# Patient Record
Sex: FEMALE | Race: WHITE | Employment: UNEMPLOYED | URBAN - METROPOLITAN AREA
[De-identification: names, ages, dates, MRNs, and addresses within clinical notes are randomized per-mention and may not be internally consistent; named-entity substitution may affect disease eponyms.]

---
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
## 2022-02-05 ENCOUNTER — HOSPITAL ENCOUNTER (EMERGENCY)
Facility: CLINIC | Age: 22
Discharge: HOME | End: 2022-02-05
Attending: EMERGENCY MEDICINE | Admitting: EMERGENCY MEDICINE
Payer: COMMERCIAL

## 2022-02-05 VITALS
RESPIRATION RATE: 18 BRPM | DIASTOLIC BLOOD PRESSURE: 73 MMHG | HEART RATE: 72 BPM | OXYGEN SATURATION: 99 % | TEMPERATURE: 97 F | SYSTOLIC BLOOD PRESSURE: 131 MMHG

## 2022-02-05 DIAGNOSIS — T74.21XA SEXUAL ASSAULT OF ADULT, INITIAL ENCOUNTER: ICD-10-CM

## 2022-02-05 PROCEDURE — 250N000011 HC RX IP 250 OP 636: Performed by: EMERGENCY MEDICINE

## 2022-02-05 PROCEDURE — 99285 EMERGENCY DEPT VISIT HI MDM: CPT | Mod: 25

## 2022-02-05 PROCEDURE — 250N000009 HC RX 250: Performed by: EMERGENCY MEDICINE

## 2022-02-05 PROCEDURE — 250N000013 HC RX MED GY IP 250 OP 250 PS 637: Performed by: EMERGENCY MEDICINE

## 2022-02-05 PROCEDURE — 96372 THER/PROPH/DIAG INJ SC/IM: CPT | Performed by: EMERGENCY MEDICINE

## 2022-02-05 RX ORDER — METRONIDAZOLE 500 MG/1
500 TABLET ORAL 2 TIMES DAILY
Qty: 14 TABLET | Refills: 0 | Status: SHIPPED | OUTPATIENT
Start: 2022-02-05 | End: 2022-02-12

## 2022-02-05 RX ORDER — EMTRICITABINE AND TENOFOVIR DISOPROXIL FUMARATE 200; 300 MG/1; MG/1
1 TABLET, FILM COATED ORAL DAILY
Qty: 25 TABLET | Refills: 0 | Status: SHIPPED | OUTPATIENT
Start: 2022-02-05 | End: 2022-03-02

## 2022-02-05 RX ORDER — ONDANSETRON 4 MG/1
4 TABLET, ORALLY DISINTEGRATING ORAL EVERY 8 HOURS PRN
Qty: 10 TABLET | Refills: 0 | Status: SHIPPED | OUTPATIENT
Start: 2022-02-05 | End: 2022-02-08

## 2022-02-05 RX ORDER — DOLUTEGRAVIR SODIUM 50 MG/1
50 TABLET, FILM COATED ORAL DAILY
Qty: 25 TABLET | Refills: 0 | Status: SHIPPED | OUTPATIENT
Start: 2022-02-05 | End: 2022-03-02

## 2022-02-05 RX ORDER — DOXYCYCLINE 100 MG/1
100 CAPSULE ORAL 2 TIMES DAILY
Qty: 14 CAPSULE | Refills: 0 | Status: SHIPPED | OUTPATIENT
Start: 2022-02-05 | End: 2022-02-12

## 2022-02-05 RX ORDER — EMTRICITABINE AND TENOFOVIR DISOPROXIL FUMARATE 200; 300 MG/1; MG/1
1 TABLET, FILM COATED ORAL ONCE
Status: COMPLETED | OUTPATIENT
Start: 2022-02-05 | End: 2022-02-05

## 2022-02-05 RX ADMIN — DOLUTEGRAVIR SODIUM 50 MG: 50 TABLET, FILM COATED ORAL at 13:16

## 2022-02-05 RX ADMIN — LIDOCAINE HYDROCHLORIDE 500 MG: 10 INJECTION, SOLUTION EPIDURAL; INFILTRATION; INTRACAUDAL; PERINEURAL at 13:16

## 2022-02-05 RX ADMIN — ULIPRISTAL ACETATE 30 MG: 30 TABLET ORAL at 13:16

## 2022-02-05 RX ADMIN — EMTRICITABINE AND TENOFOVIR DISOPROXIL FUMARATE 1 TABLET: 200; 300 TABLET, FILM COATED ORAL at 13:16

## 2022-02-05 ASSESSMENT — ENCOUNTER SYMPTOMS
ARTHRALGIAS: 0
ABDOMINAL PAIN: 0
MYALGIAS: 0

## 2022-02-05 NOTE — ED NOTES
3 day supply of tivicay and truvada given and discussed with pt and mother. All questions answered.
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
## 2022-10-20 ENCOUNTER — LAB REQUISITION (OUTPATIENT)
Dept: LAB | Facility: CLINIC | Age: 22
End: 2022-10-20
Payer: COMMERCIAL

## 2022-10-20 PROCEDURE — 88305 TISSUE EXAM BY PATHOLOGIST: CPT | Mod: TC,ORL | Performed by: OBSTETRICS & GYNECOLOGY

## 2022-10-20 PROCEDURE — 88305 TISSUE EXAM BY PATHOLOGIST: CPT | Mod: 26 | Performed by: PATHOLOGY

## 2022-10-26 LAB
PATH REPORT.COMMENTS IMP SPEC: NORMAL
PATH REPORT.COMMENTS IMP SPEC: NORMAL
PATH REPORT.FINAL DX SPEC: NORMAL
PATH REPORT.GROSS SPEC: NORMAL
PATH REPORT.MICROSCOPIC SPEC OTHER STN: NORMAL
PATH REPORT.RELEVANT HX SPEC: NORMAL
PHOTO IMAGE: NORMAL

## 2024-08-09 LAB
HEPATITIS B SURFACE ANTIGEN (EXTERNAL): NEGATIVE
HIV1+2 AB SERPL QL IA: NEGATIVE
RUBELLA ANTIBODY IGG (EXTERNAL): NORMAL

## 2025-01-14 LAB — GROUP B STREPTOCOCCUS (EXTERNAL): NEGATIVE

## 2025-01-24 ENCOUNTER — HOSPITAL ENCOUNTER (INPATIENT)
Facility: CLINIC | Age: 25
LOS: 3 days | Discharge: HOME | End: 2025-01-27
Attending: OBSTETRICS & GYNECOLOGY | Admitting: OBSTETRICS & GYNECOLOGY
Payer: COMMERCIAL

## 2025-01-24 PROBLEM — Z34.90 ENCOUNTER FOR ELECTIVE INDUCTION OF LABOR: Status: ACTIVE | Noted: 2025-01-24

## 2025-01-24 LAB
ABO + RH BLD: ABNORMAL
BLD GP AB SCN SERPL QL: POSITIVE
HGB BLD-MCNC: 9.7 G/DL (ref 11.7–15.7)
SPECIMEN EXP DATE BLD: ABNORMAL

## 2025-01-24 PROCEDURE — 86900 BLOOD TYPING SEROLOGIC ABO: CPT | Performed by: OBSTETRICS & GYNECOLOGY

## 2025-01-24 PROCEDURE — 250N000013 HC RX MED GY IP 250 OP 250 PS 637: Performed by: OBSTETRICS & GYNECOLOGY

## 2025-01-24 PROCEDURE — 86780 TREPONEMA PALLIDUM: CPT | Performed by: OBSTETRICS & GYNECOLOGY

## 2025-01-24 PROCEDURE — 120N000001 HC R&B MED SURG/OB

## 2025-01-24 PROCEDURE — 85018 HEMOGLOBIN: CPT | Performed by: OBSTETRICS & GYNECOLOGY

## 2025-01-24 PROCEDURE — 86870 RBC ANTIBODY IDENTIFICATION: CPT | Performed by: OBSTETRICS & GYNECOLOGY

## 2025-01-24 RX ORDER — OXYTOCIN 10 [USP'U]/ML
10 INJECTION, SOLUTION INTRAMUSCULAR; INTRAVENOUS
Status: DISCONTINUED | OUTPATIENT
Start: 2025-01-24 | End: 2025-01-25 | Stop reason: HOSPADM

## 2025-01-24 RX ORDER — VITAMIN A, VITAMIN C, VITAMIN D-3, VITAMIN E, VITAMIN B-1, VITAMIN B-2, NIACIN, VITAMIN B-6, CALCIUM, IRON, ZINC, COPPER 4000; 120; 400; 22; 1.84; 3; 20; 10; 1; 12; 200; 27; 25; 2 [IU]/1; MG/1; [IU]/1; MG/1; MG/1; MG/1; MG/1; MG/1; MG/1; UG/1; MG/1; MG/1; MG/1; MG/1
1 TABLET ORAL DAILY
COMMUNITY

## 2025-01-24 RX ORDER — METOCLOPRAMIDE 10 MG/1
10 TABLET ORAL EVERY 6 HOURS PRN
Status: DISCONTINUED | OUTPATIENT
Start: 2025-01-24 | End: 2025-01-25 | Stop reason: HOSPADM

## 2025-01-24 RX ORDER — METHYLERGONOVINE MALEATE 0.2 MG/ML
200 INJECTION INTRAVENOUS
Status: DISCONTINUED | OUTPATIENT
Start: 2025-01-24 | End: 2025-01-25 | Stop reason: HOSPADM

## 2025-01-24 RX ORDER — LOPERAMIDE HYDROCHLORIDE 2 MG/1
4 CAPSULE ORAL
Status: DISCONTINUED | OUTPATIENT
Start: 2025-01-24 | End: 2025-01-25 | Stop reason: HOSPADM

## 2025-01-24 RX ORDER — NALOXONE HYDROCHLORIDE 0.4 MG/ML
0.2 INJECTION, SOLUTION INTRAMUSCULAR; INTRAVENOUS; SUBCUTANEOUS
Status: DISCONTINUED | OUTPATIENT
Start: 2025-01-24 | End: 2025-01-25 | Stop reason: HOSPADM

## 2025-01-24 RX ORDER — PROCHLORPERAZINE MALEATE 10 MG
10 TABLET ORAL EVERY 6 HOURS PRN
Status: DISCONTINUED | OUTPATIENT
Start: 2025-01-24 | End: 2025-01-25 | Stop reason: HOSPADM

## 2025-01-24 RX ORDER — MISOPROSTOL 200 UG/1
800 TABLET ORAL
Status: DISCONTINUED | OUTPATIENT
Start: 2025-01-24 | End: 2025-01-25 | Stop reason: HOSPADM

## 2025-01-24 RX ORDER — METOCLOPRAMIDE HYDROCHLORIDE 5 MG/ML
10 INJECTION INTRAMUSCULAR; INTRAVENOUS EVERY 6 HOURS PRN
Status: DISCONTINUED | OUTPATIENT
Start: 2025-01-24 | End: 2025-01-25 | Stop reason: HOSPADM

## 2025-01-24 RX ORDER — TRANEXAMIC ACID 10 MG/ML
1 INJECTION, SOLUTION INTRAVENOUS EVERY 30 MIN PRN
Status: DISCONTINUED | OUTPATIENT
Start: 2025-01-24 | End: 2025-01-25 | Stop reason: HOSPADM

## 2025-01-24 RX ORDER — KETOROLAC TROMETHAMINE 30 MG/ML
30 INJECTION, SOLUTION INTRAMUSCULAR; INTRAVENOUS
Status: COMPLETED | OUTPATIENT
Start: 2025-01-24 | End: 2025-01-25

## 2025-01-24 RX ORDER — CITRIC ACID/SODIUM CITRATE 334-500MG
30 SOLUTION, ORAL ORAL
Status: DISCONTINUED | OUTPATIENT
Start: 2025-01-24 | End: 2025-01-25 | Stop reason: HOSPADM

## 2025-01-24 RX ORDER — OXYTOCIN/0.9 % SODIUM CHLORIDE 30/500 ML
100-340 PLASTIC BAG, INJECTION (ML) INTRAVENOUS CONTINUOUS PRN
Status: DISCONTINUED | OUTPATIENT
Start: 2025-01-24 | End: 2025-01-27 | Stop reason: HOSPADM

## 2025-01-24 RX ORDER — IBUPROFEN 800 MG/1
800 TABLET, FILM COATED ORAL
Status: COMPLETED | OUTPATIENT
Start: 2025-01-24 | End: 2025-01-25

## 2025-01-24 RX ORDER — OXYTOCIN/0.9 % SODIUM CHLORIDE 30/500 ML
340 PLASTIC BAG, INJECTION (ML) INTRAVENOUS CONTINUOUS PRN
Status: DISCONTINUED | OUTPATIENT
Start: 2025-01-24 | End: 2025-01-25 | Stop reason: HOSPADM

## 2025-01-24 RX ORDER — LOPERAMIDE HYDROCHLORIDE 2 MG/1
2 CAPSULE ORAL
Status: DISCONTINUED | OUTPATIENT
Start: 2025-01-24 | End: 2025-01-25 | Stop reason: HOSPADM

## 2025-01-24 RX ORDER — OXYTOCIN 10 [USP'U]/ML
10 INJECTION, SOLUTION INTRAMUSCULAR; INTRAVENOUS
Status: DISCONTINUED | OUTPATIENT
Start: 2025-01-24 | End: 2025-01-27 | Stop reason: HOSPADM

## 2025-01-24 RX ORDER — CARBOPROST TROMETHAMINE 250 UG/ML
250 INJECTION, SOLUTION INTRAMUSCULAR
Status: DISCONTINUED | OUTPATIENT
Start: 2025-01-24 | End: 2025-01-25 | Stop reason: HOSPADM

## 2025-01-24 RX ORDER — ONDANSETRON 4 MG/1
4 TABLET, ORALLY DISINTEGRATING ORAL EVERY 6 HOURS PRN
Status: DISCONTINUED | OUTPATIENT
Start: 2025-01-24 | End: 2025-01-25 | Stop reason: HOSPADM

## 2025-01-24 RX ORDER — NALOXONE HYDROCHLORIDE 0.4 MG/ML
0.4 INJECTION, SOLUTION INTRAMUSCULAR; INTRAVENOUS; SUBCUTANEOUS
Status: DISCONTINUED | OUTPATIENT
Start: 2025-01-24 | End: 2025-01-25 | Stop reason: HOSPADM

## 2025-01-24 RX ORDER — MISOPROSTOL 200 UG/1
400 TABLET ORAL
Status: DISCONTINUED | OUTPATIENT
Start: 2025-01-24 | End: 2025-01-25 | Stop reason: HOSPADM

## 2025-01-24 RX ORDER — ONDANSETRON 2 MG/ML
4 INJECTION INTRAMUSCULAR; INTRAVENOUS EVERY 6 HOURS PRN
Status: DISCONTINUED | OUTPATIENT
Start: 2025-01-24 | End: 2025-01-25 | Stop reason: HOSPADM

## 2025-01-24 RX ORDER — MISOPROSTOL 100 UG/1
25 TABLET ORAL
Status: DISCONTINUED | OUTPATIENT
Start: 2025-01-24 | End: 2025-01-25 | Stop reason: HOSPADM

## 2025-01-24 RX ORDER — ACETAMINOPHEN 325 MG/1
325-650 TABLET ORAL EVERY 6 HOURS PRN
COMMUNITY

## 2025-01-24 RX ADMIN — MISOPROSTOL 25 MCG: 100 TABLET ORAL at 22:22

## 2025-01-24 ASSESSMENT — ACTIVITIES OF DAILY LIVING (ADL)
ADLS_ACUITY_SCORE: 41
ADLS_ACUITY_SCORE: 41
ADLS_ACUITY_SCORE: 15

## 2025-01-25 ENCOUNTER — ANESTHESIA (OUTPATIENT)
Dept: OBGYN | Facility: CLINIC | Age: 25
End: 2025-01-25
Payer: COMMERCIAL

## 2025-01-25 ENCOUNTER — ANESTHESIA EVENT (OUTPATIENT)
Dept: OBGYN | Facility: CLINIC | Age: 25
End: 2025-01-25
Payer: COMMERCIAL

## 2025-01-25 LAB
BLD GP AB INVEST PLASRBC-IMP: NORMAL
SPECIMEN EXP DATE BLD: NORMAL
T PALLIDUM AB SER QL: NONREACTIVE

## 2025-01-25 PROCEDURE — 370N000003 HC ANESTHESIA WARD SERVICE: Performed by: ANESTHESIOLOGY

## 2025-01-25 PROCEDURE — 0HQ9XZZ REPAIR PERINEUM SKIN, EXTERNAL APPROACH: ICD-10-PCS | Performed by: OBSTETRICS & GYNECOLOGY

## 2025-01-25 PROCEDURE — 10907ZC DRAINAGE OF AMNIOTIC FLUID, THERAPEUTIC FROM PRODUCTS OF CONCEPTION, VIA NATURAL OR ARTIFICIAL OPENING: ICD-10-PCS | Performed by: OBSTETRICS & GYNECOLOGY

## 2025-01-25 PROCEDURE — 250N000009 HC RX 250: Performed by: OBSTETRICS & GYNECOLOGY

## 2025-01-25 PROCEDURE — 250N000011 HC RX IP 250 OP 636: Performed by: ANESTHESIOLOGY

## 2025-01-25 PROCEDURE — 00HU33Z INSERTION OF INFUSION DEVICE INTO SPINAL CANAL, PERCUTANEOUS APPROACH: ICD-10-PCS | Performed by: ANESTHESIOLOGY

## 2025-01-25 PROCEDURE — 250N000013 HC RX MED GY IP 250 OP 250 PS 637: Performed by: OBSTETRICS & GYNECOLOGY

## 2025-01-25 PROCEDURE — 250N000011 HC RX IP 250 OP 636: Performed by: OBSTETRICS & GYNECOLOGY

## 2025-01-25 PROCEDURE — 120N000001 HC R&B MED SURG/OB

## 2025-01-25 PROCEDURE — 258N000003 HC RX IP 258 OP 636: Performed by: OBSTETRICS & GYNECOLOGY

## 2025-01-25 PROCEDURE — 3E0R3BZ INTRODUCTION OF ANESTHETIC AGENT INTO SPINAL CANAL, PERCUTANEOUS APPROACH: ICD-10-PCS | Performed by: ANESTHESIOLOGY

## 2025-01-25 PROCEDURE — 722N000001 HC LABOR CARE VAGINAL DELIVERY SINGLE

## 2025-01-25 RX ORDER — NALBUPHINE HYDROCHLORIDE 10 MG/ML
2.5-5 INJECTION INTRAMUSCULAR; INTRAVENOUS; SUBCUTANEOUS EVERY 6 HOURS PRN
Status: DISCONTINUED | OUTPATIENT
Start: 2025-01-25 | End: 2025-01-25

## 2025-01-25 RX ORDER — ONDANSETRON 2 MG/ML
4 INJECTION INTRAMUSCULAR; INTRAVENOUS EVERY 6 HOURS PRN
Status: DISCONTINUED | OUTPATIENT
Start: 2025-01-25 | End: 2025-01-25

## 2025-01-25 RX ORDER — ACETAMINOPHEN 325 MG/1
650 TABLET ORAL EVERY 4 HOURS PRN
Status: DISCONTINUED | OUTPATIENT
Start: 2025-01-25 | End: 2025-01-27 | Stop reason: HOSPADM

## 2025-01-25 RX ORDER — LOPERAMIDE HYDROCHLORIDE 2 MG/1
2 CAPSULE ORAL
Status: DISCONTINUED | OUTPATIENT
Start: 2025-01-25 | End: 2025-01-27 | Stop reason: HOSPADM

## 2025-01-25 RX ORDER — HYDROCORTISONE 25 MG/G
CREAM TOPICAL 3 TIMES DAILY PRN
Status: DISCONTINUED | OUTPATIENT
Start: 2025-01-25 | End: 2025-01-27 | Stop reason: HOSPADM

## 2025-01-25 RX ORDER — MODIFIED LANOLIN
OINTMENT (GRAM) TOPICAL
Status: DISCONTINUED | OUTPATIENT
Start: 2025-01-25 | End: 2025-01-27 | Stop reason: HOSPADM

## 2025-01-25 RX ORDER — OXYTOCIN/0.9 % SODIUM CHLORIDE 30/500 ML
1-24 PLASTIC BAG, INJECTION (ML) INTRAVENOUS CONTINUOUS
Status: DISCONTINUED | OUTPATIENT
Start: 2025-01-25 | End: 2025-01-25

## 2025-01-25 RX ORDER — ONDANSETRON 4 MG/1
4 TABLET, ORALLY DISINTEGRATING ORAL EVERY 6 HOURS PRN
Status: DISCONTINUED | OUTPATIENT
Start: 2025-01-25 | End: 2025-01-25

## 2025-01-25 RX ORDER — SODIUM CHLORIDE, SODIUM LACTATE, POTASSIUM CHLORIDE, CALCIUM CHLORIDE 600; 310; 30; 20 MG/100ML; MG/100ML; MG/100ML; MG/100ML
INJECTION, SOLUTION INTRAVENOUS CONTINUOUS
Status: DISCONTINUED | OUTPATIENT
Start: 2025-01-25 | End: 2025-01-25

## 2025-01-25 RX ORDER — BISACODYL 10 MG
10 SUPPOSITORY, RECTAL RECTAL DAILY PRN
Status: DISCONTINUED | OUTPATIENT
Start: 2025-01-25 | End: 2025-01-27 | Stop reason: HOSPADM

## 2025-01-25 RX ORDER — FENTANYL CITRATE 50 UG/ML
100 INJECTION, SOLUTION INTRAMUSCULAR; INTRAVENOUS ONCE
Status: DISCONTINUED | OUTPATIENT
Start: 2025-01-25 | End: 2025-01-25

## 2025-01-25 RX ORDER — FENTANYL CITRATE 50 UG/ML
INJECTION, SOLUTION INTRAMUSCULAR; INTRAVENOUS
Status: COMPLETED | OUTPATIENT
Start: 2025-01-25 | End: 2025-01-25

## 2025-01-25 RX ORDER — OXYTOCIN/0.9 % SODIUM CHLORIDE 30/500 ML
340 PLASTIC BAG, INJECTION (ML) INTRAVENOUS CONTINUOUS PRN
Status: DISCONTINUED | OUTPATIENT
Start: 2025-01-25 | End: 2025-01-27 | Stop reason: HOSPADM

## 2025-01-25 RX ORDER — OXYTOCIN 10 [USP'U]/ML
10 INJECTION, SOLUTION INTRAMUSCULAR; INTRAVENOUS
Status: DISCONTINUED | OUTPATIENT
Start: 2025-01-25 | End: 2025-01-27 | Stop reason: HOSPADM

## 2025-01-25 RX ORDER — TRANEXAMIC ACID 10 MG/ML
1 INJECTION, SOLUTION INTRAVENOUS EVERY 30 MIN PRN
Status: DISCONTINUED | OUTPATIENT
Start: 2025-01-25 | End: 2025-01-27 | Stop reason: HOSPADM

## 2025-01-25 RX ORDER — ONDANSETRON 2 MG/ML
4 INJECTION INTRAMUSCULAR; INTRAVENOUS EVERY 6 HOURS PRN
Status: DISCONTINUED | OUTPATIENT
Start: 2025-01-25 | End: 2025-01-27 | Stop reason: HOSPADM

## 2025-01-25 RX ORDER — CARBOPROST TROMETHAMINE 250 UG/ML
250 INJECTION, SOLUTION INTRAMUSCULAR
Status: DISCONTINUED | OUTPATIENT
Start: 2025-01-25 | End: 2025-01-27 | Stop reason: HOSPADM

## 2025-01-25 RX ORDER — FENTANYL CITRATE 50 UG/ML
100 INJECTION, SOLUTION INTRAMUSCULAR; INTRAVENOUS
Status: DISCONTINUED | OUTPATIENT
Start: 2025-01-25 | End: 2025-01-25

## 2025-01-25 RX ORDER — EPHEDRINE SULFATE 50 MG/ML
5 INJECTION, SOLUTION INTRAMUSCULAR; INTRAVENOUS; SUBCUTANEOUS
Status: DISCONTINUED | OUTPATIENT
Start: 2025-01-25 | End: 2025-01-25

## 2025-01-25 RX ORDER — DOCUSATE SODIUM 100 MG/1
100 CAPSULE, LIQUID FILLED ORAL DAILY
Status: DISCONTINUED | OUTPATIENT
Start: 2025-01-25 | End: 2025-01-27 | Stop reason: HOSPADM

## 2025-01-25 RX ORDER — MISOPROSTOL 200 UG/1
400 TABLET ORAL
Status: DISCONTINUED | OUTPATIENT
Start: 2025-01-25 | End: 2025-01-27 | Stop reason: HOSPADM

## 2025-01-25 RX ORDER — METHYLERGONOVINE MALEATE 0.2 MG/ML
200 INJECTION INTRAVENOUS
Status: DISCONTINUED | OUTPATIENT
Start: 2025-01-25 | End: 2025-01-27 | Stop reason: HOSPADM

## 2025-01-25 RX ORDER — MISOPROSTOL 200 UG/1
800 TABLET ORAL
Status: DISCONTINUED | OUTPATIENT
Start: 2025-01-25 | End: 2025-01-27 | Stop reason: HOSPADM

## 2025-01-25 RX ORDER — LOPERAMIDE HYDROCHLORIDE 2 MG/1
4 CAPSULE ORAL
Status: DISCONTINUED | OUTPATIENT
Start: 2025-01-25 | End: 2025-01-27 | Stop reason: HOSPADM

## 2025-01-25 RX ORDER — IBUPROFEN 800 MG/1
800 TABLET, FILM COATED ORAL EVERY 6 HOURS PRN
Status: DISCONTINUED | OUTPATIENT
Start: 2025-01-26 | End: 2025-01-27 | Stop reason: HOSPADM

## 2025-01-25 RX ADMIN — MISOPROSTOL 25 MCG: 100 TABLET ORAL at 00:44

## 2025-01-25 RX ADMIN — SODIUM CHLORIDE, POTASSIUM CHLORIDE, SODIUM LACTATE AND CALCIUM CHLORIDE: 600; 310; 30; 20 INJECTION, SOLUTION INTRAVENOUS at 13:49

## 2025-01-25 RX ADMIN — MISOPROSTOL 25 MCG: 100 TABLET ORAL at 02:51

## 2025-01-25 RX ADMIN — KETOROLAC TROMETHAMINE 30 MG: 30 INJECTION, SOLUTION INTRAMUSCULAR at 18:38

## 2025-01-25 RX ADMIN — Medication: at 15:45

## 2025-01-25 RX ADMIN — ONDANSETRON 4 MG: 2 INJECTION INTRAMUSCULAR; INTRAVENOUS at 19:57

## 2025-01-25 RX ADMIN — FENTANYL CITRATE 100 MCG: 50 INJECTION, SOLUTION INTRAMUSCULAR; INTRAVENOUS at 13:41

## 2025-01-25 RX ADMIN — MISOPROSTOL 25 MCG: 100 TABLET ORAL at 10:52

## 2025-01-25 RX ADMIN — MISOPROSTOL 25 MCG: 100 TABLET ORAL at 08:52

## 2025-01-25 RX ADMIN — FENTANYL CITRATE 100 MCG: 50 INJECTION INTRAMUSCULAR; INTRAVENOUS at 15:46

## 2025-01-25 RX ADMIN — MISOPROSTOL 25 MCG: 100 TABLET ORAL at 06:47

## 2025-01-25 RX ADMIN — MISOPROSTOL 25 MCG: 100 TABLET ORAL at 04:49

## 2025-01-25 RX ADMIN — Medication 2 MILLI-UNITS/MIN: at 18:08

## 2025-01-25 ASSESSMENT — ACTIVITIES OF DAILY LIVING (ADL)
ADLS_ACUITY_SCORE: 17
ADLS_ACUITY_SCORE: 16
ADLS_ACUITY_SCORE: 17
ADLS_ACUITY_SCORE: 16
ADLS_ACUITY_SCORE: 16
ADLS_ACUITY_SCORE: 17
ADLS_ACUITY_SCORE: 16
ADLS_ACUITY_SCORE: 17
ADLS_ACUITY_SCORE: 16
ADLS_ACUITY_SCORE: 17
ADLS_ACUITY_SCORE: 16
ADLS_ACUITY_SCORE: 17
ADLS_ACUITY_SCORE: 16

## 2025-01-25 NOTE — ANESTHESIA PREPROCEDURE EVALUATION
"Anesthesia Pre-Procedure Evaluation    Patient: Sherly Iglesias   MRN: [id number] : 2000        Procedure :           Past Medical History:   Diagnosis Date    Depressive disorder       Past Surgical History:   Procedure Laterality Date    GENITOURINARY SURGERY  2009    GYN SURGERY      D&C      No Known Allergies   Social History     Tobacco Use    Smoking status: Never    Smokeless tobacco: Never   Substance Use Topics    Alcohol use: Not Currently      Wt Readings from Last 1 Encounters:   25 65.8 kg (145 lb)        Anesthesia Evaluation   Pt has had prior anesthetic.     No history of anesthetic complications       ROS/MED HX  ENT/Pulmonary:  - neg pulmonary ROS     Neurologic:  - neg neurologic ROS     Cardiovascular:  - neg cardiovascular ROS     METS/Exercise Tolerance:     Hematologic:       Musculoskeletal:       GI/Hepatic:  - neg GI/hepatic ROS     Renal/Genitourinary:       Endo:  - neg endo ROS     Psychiatric/Substance Use:  - neg psychiatric ROS     Infectious Disease:       Malignancy:       Other:            Physical Exam    Airway         TM distance: > 3 FB   Neck ROM: full   Mouth opening: > 3 cm    Respiratory Devices and Support         Dental           Cardiovascular             Pulmonary                   OUTSIDE LABS:  CBC:   Lab Results   Component Value Date    WBC 6.7 2019    HGB 9.7 (L) 2025    HGB 12.9 2019    HCT 39.3 2019     2019     BMP:   Lab Results   Component Value Date     2019    POTASSIUM 3.7 2019    CHLORIDE 109 2019    CO2 20 2019    BUN 8 2019    CR 0.72 2019    GLC 87 2019     COAGS: No results found for: \"PTT\", \"INR\", \"FIBR\"  POC:   Lab Results   Component Value Date    HCG Negative 2019     HEPATIC:   Lab Results   Component Value Date    ALBUMIN 4.7 2019    PROTTOTAL 8.4 2019    ALT 17 2019    AST 16 2019    ALKPHOS 94 2019    " BILITOTAL 0.3 03/30/2019     OTHER:   Lab Results   Component Value Date    YURIDIA 9.3 03/30/2019       Anesthesia Plan    ASA Status:  2       Anesthesia Type: Epidural.              Consents    Anesthesia Plan(s) and associated risks, benefits, and realistic alternatives discussed. Questions answered and patient/representative(s) expressed understanding.     - Discussed:     - Discussed with:  Patient            Postoperative Care            Comments:           neg OB ROS.      Vitaliy Dean MD    I have reviewed the pertinent notes and labs in the chart from the past 30 days and (re)examined the patient.  Any updates or changes from those notes are reflected in this note.    Clinically Significant Risk Factors Present on Admission                        # Anemia: based on hgb <11

## 2025-01-25 NOTE — PROVIDER NOTIFICATION
01/25/25 1531   Provider Notification   Provider Name/Title Dr. Mcneal   Method of Notification Electronic Page     Updated Dr. Mcneal that pt is now getting epidural. SVE 4/90/1 with bloody show.    MD orders for SVE after epidural and update.

## 2025-01-25 NOTE — PROVIDER NOTIFICATION
01/25/25 1000   Provider Notification   Provider Name/Title Dr. Mcneal   Method of Notification Phone     Dr. Mcneal called for update. Updated MD on ctx's q4-5 minutes, palpating mild. She has received 6 doses of PO Miso. Patient rating 3/10, coping. FHT's currently cat I tracing with moderate variability and accelerations present (had one VD over an hour ago).    MD states she is currently at [facility] and to page her if needed.

## 2025-01-25 NOTE — PROVIDER NOTIFICATION
01/25/25 1611   Provider Notification   Provider Name/Title Dr. Mcneal   Method of Notification Electronic Page   Request Evaluate - Remote     Vocera update to Dr. Mcneal:    Pt is comfortable, pool in place, SVE 5.5/95/2. The baby is very low. Cat I tracing currently. Spontaneous ctx's q2-4 min.    MD states she will head over in 20 minutes.

## 2025-01-25 NOTE — PROVIDER NOTIFICATION
01/25/25 1532   Provider Notification   Provider Name/Title Dr. Dean   Method of Notification At Bedside     Dr. Dean at bedside for epidural placement per pt request for pain management in labor.

## 2025-01-25 NOTE — PROVIDER NOTIFICATION
01/25/25 1159   Provider Notification   Provider Name/Title Dr. Mcneal   Method of Notification At Bedside   Request Evaluate in Person     Dr. Mcneal at bedside to evaluate patient. SVE per provider 2/80/+1, AROM with large amount of clear fluid noted. Plan per provider is to monitor for 2 hours, recheck SVE at 1400. Primary RN Domonique hudson.

## 2025-01-25 NOTE — H&P
Admission H and P    S: Pt is being admitted for induction of labor for known gestational hypertension.     Past Medical History: Depression    Surgical: Negative    Social: Partnered. No D/A/T    O:  There were no vitals taken for this visit.  SVE: Per nursing at arrival  TOCO: irregular contractions  FHR: 135 baseline. Reactive    A/P: 25 yo  at 37+0/7 wks. Induction of labor for gestational hypertension.   Anticipate . Will start with oral Cytotec induction. Monitor BP closely. Low threshold for BP management.   Pain medication as desired.   GBS negative, RH negative.     Shama Mcneal MD

## 2025-01-25 NOTE — PLAN OF CARE
"Pt resting in bed. No signs/symptoms of distress. Pt states she was able to get intermittent sleep throughout the night. Rates contraction pain 4/10 - still talking through them. Cat 1 FHR tracing. Anticipate giving report to UBALDO Youssef, who will assume cares after 0700. Shama Perea RN on 1/25/2025 at 7:05 AM    Problem: Adult Inpatient Plan of Care  Goal: Patient-Specific Goal (Individualized)  Description: You can add care plan individualizations to a care plan. Examples of Individualization might be:  \"Parent requests to be called daily at 9am for status\", \"I have a hard time hearing out of my right ear\", or \"Do not touch me to wake me up as it startles  me\".  Outcome: Progressing  Flowsheets (Taken 1/24/2025 2230)  Individualized Care Needs: None  Anxieties, Fears or Concerns: None  Goal: Absence of Hospital-Acquired Illness or Injury  Intervention: Prevent Skin Injury  Recent Flowsheet Documentation  Taken 1/24/2025 2117 by Shama Perea RN  Body Position: position changed independently  Goal: Optimal Comfort and Wellbeing  Intervention: Provide Person-Centered Care  Recent Flowsheet Documentation  Taken 1/24/2025 2117 by Shama Perea RN  Trust Relationship/Rapport:   care explained   choices provided   questions answered   questions encouraged   thoughts/feelings acknowledged  Goal: Readiness for Transition of Care  Outcome: Progressing  Flowsheets (Taken 1/24/2025 2230)  Transportation Anticipated: family or friend will provide  Intervention: Mutually Develop Transition Plan  Recent Flowsheet Documentation  Taken 1/24/2025 2230 by Shama Perea RN  Transportation Anticipated: family or friend will provide  Patient/Family Anticipated Services at Transition: none  Patient/Family Anticipates Transition to: home with family  Taken 1/24/2025 2225 by Shama Perea RN  Equipment Currently Used at Home: none     Problem: Labor  Goal: Stable Fetal Wellbeing  Intervention: Promote and " Monitor Fetal Wellbeing  Recent Flowsheet Documentation  Taken 1/24/2025 2117 by Shama Perea, RN  Body Position: position changed independently   Goal Outcome Evaluation:

## 2025-01-25 NOTE — PROVIDER NOTIFICATION
01/25/25 0582   Provider Notification   Provider Name/Title Dr. Mcneal   Method of Notification Electronic Page   Request Evaluate - Remote   Notification Reason Pain;SVE;Status Update     Vocera update to Dr. Mcneal:    Pt requested an SVE (3.5/80/1) and asked for a dose of IV Fentanyl--given x1 with some relief. Spontaneous ctx's q1-3 min, palpating moderate. FHT's cat I with moderate variability and accelerations. She will probably request an epidural within the hour or two. I assume you'll want a pool? Thx.    Orders from Dr. Mcneal for pool after epidural and to reassess SVE afterwards or in two hours. Pt and SO updated and agreeable with plan--all questions answered.

## 2025-01-25 NOTE — PROGRESS NOTES
"Labor Progress Notes:    S: Pt is feeling moderate discomfort with contractions. She is into her oral cytotec course.     O:  /85 (BP Location: Right arm, Patient Position: Semi-Jasso's, Cuff Size: Adult Regular)   Temp 97.8  F (36.6  C) (Oral)   Resp 16   Ht 1.651 m (5' 5\")   Wt 65.8 kg (145 lb)   SpO2 97%   Breastfeeding No   BMI 24.13 kg/m    TOCO: Q2-4 minutes  FHR: Cat 1  SVE: 2/80/+1 AROM with clear fluid    A/P: 25 yo  at 37+1/7 wks. Induction of labor for JULIUSTN.   Discussed labor course. She has now moved beyond cervical ripening. AROM offered and accepted. Will plan for ambulation with monitoring. If no increase in regularity of contractions or cervical change in 2 hours will consider pitocin augmentation.   Epidural for pain management.   GBS negative, RH negative.     Shama Mcneal MD    "

## 2025-01-25 NOTE — PROVIDER NOTIFICATION
01/25/25 1713   Provider Notification   Provider Name/Title Dr. Mcneal   Method of Notification At Bedside     Updated Dr. Mcneal in department on 5 min PD with abigail to 60's just under an hour ago. Resolved with repositioning L to R lateral. FHT's are now minimal to moderate variability with no accels and intermittent ED's/VD's. MD reviewed tracing at bedside. SVE just now was 7.5/95/2. Pt is not yet feeling pressure.    MD states she will recheck SVE in 45 minutes or sooner as needed. Pt and SO updated and agreeable.

## 2025-01-25 NOTE — PLAN OF CARE
"Contractions difficult to monitor. Pt reports she is feeling \"some\" contractions. Regular contractions vs. Irritability questioned. [place] adjusted. Shama Perea RN on 1/25/2025 at 5:06 AM    "

## 2025-01-25 NOTE — PROVIDER NOTIFICATION
01/24/25 2200   Provider Notification   Provider Name/Title Dr. Mcneal   Method of Notification Electronic Page   Request Evaluate - Remote   Notification Reason Patient Arrived     Physician informed of pt's arrival and current maternal and fetal condition. Orders already in place for cervical ripening with PO cytotec. Pt informed of plan of care. Shama Perea RN on 1/25/2025 at 3:33 AM

## 2025-01-25 NOTE — ANESTHESIA PROCEDURE NOTES
"Epidural catheter Procedure Note    Pre-Procedure   Staff -        Anesthesiologist:  Vitaliy Dean MD       Performed By: anesthesiologist       Location: OB       Procedure Start/Stop Times: 1/25/2025 3:37 PM and 1/25/2025 3:53 PM       Pre-Anesthestic Checklist: patient identified, IV checked, risks and benefits discussed, informed consent, monitors and equipment checked, pre-op evaluation and at physician/surgeon's request  Timeout:       Correct Patient: Yes        Correct Procedure: Yes        Correct Site: Yes        Correct Position: Yes   Procedure Documentation  Procedure: epidural catheter         Patient Position: sitting       Patient Prep/Sterile Barriers: sterile gloves, mask, patient draped       Skin prep: Betadine       Local skin infiltrated with 3 mL of 1% lidocaine.        Insertion Site: L3-4. (midline approach).       Technique: LORT saline        MANDI at 4 cm.       Needle Type: Journeysy needle       Needle Gauge: 17.        Needle Length (Inches): 3.5        Catheter: 19 G.          Catheter threaded easily.         5 cm epidural space.         Threaded 9 cm at skin.         # of attempts: 1 and  # of redirects:  0    Assessment/Narrative         Paresthesias: No.       Test dose of 5 mL lidocaine 1.5% w/ 1:200,000 epinephrine at 15:46 CST.        .       Insertion/Infusion Method: LORT saline       Aspiration negative for Heme or CSF via Epidural Catheter.    Medication(s) Administered   0.125% Bupivacaine + 2 mcg/mL Fentanyl via CADD (Epidural) - EPIDURAL   10 mL - 1/25/2025 3:46:00 PM  Fentanyl PF (Epidural) - EPIDURAL   100 mcg - 1/25/2025 3:46:00 PM  Medication Administration Time: 1/25/2025 3:37 PM     Comments:  Perifix, lot [id number], exp. 2025-12-31      FOR [facility] ([facility] ONLY:   Pain Team Contact information: please page the Pain Team Via [place]. Search \"Pain\". During daytime hours, please page the attending first. At night please page the resident first.      "

## 2025-01-25 NOTE — PLAN OF CARE
Data: [facility] presented to [facility]:2025 8:34 PM. Reason for maternal/fetal assessment is: scheduled medical induction for GHTN. Patient is a .  Prenatal record reviewed. Pregnancy  has been complicated by gestational hypertension.  Gestational Age 37w0d. VSS. Fetal movement active. Patient denies uterine contractions, leaking of vaginal fluid/rupture of membranes, vaginal bleeding, abdominal pain, pelvic pressure, nausea, vomiting, headache, visual disturbances, epigastric or URQ pain, significant edema. Support person is present.   Action: Verbal consent for EFM. Triage assessment completed. Bill of rights reviewed.  Response: Patient verbalized agreement with plan. Will contact Dr Shama Mcneal with update and for further orders. Shama Perea RN on 2025 at 3:31 AM

## 2025-01-26 LAB
ABO + RH BLD: NORMAL
FETAL CELL SCN BLD-IMP: NORMAL
HGB BLD-MCNC: 10.7 G/DL (ref 11.7–15.7)
SPECIMEN EXP DATE BLD: NORMAL

## 2025-01-26 PROCEDURE — 250N000011 HC RX IP 250 OP 636: Performed by: OBSTETRICS & GYNECOLOGY

## 2025-01-26 PROCEDURE — 250N000013 HC RX MED GY IP 250 OP 250 PS 637: Performed by: OBSTETRICS & GYNECOLOGY

## 2025-01-26 PROCEDURE — 86900 BLOOD TYPING SEROLOGIC ABO: CPT | Performed by: OBSTETRICS & GYNECOLOGY

## 2025-01-26 PROCEDURE — 120N000001 HC R&B MED SURG/OB

## 2025-01-26 PROCEDURE — 36415 COLL VENOUS BLD VENIPUNCTURE: CPT | Performed by: OBSTETRICS & GYNECOLOGY

## 2025-01-26 PROCEDURE — 85018 HEMOGLOBIN: CPT | Performed by: OBSTETRICS & GYNECOLOGY

## 2025-01-26 PROCEDURE — 85461 HEMOGLOBIN FETAL: CPT | Performed by: OBSTETRICS & GYNECOLOGY

## 2025-01-26 PROCEDURE — 999N000080 HC STATISTIC IP LACTATION SERVICES 16-30 MIN

## 2025-01-26 RX ADMIN — IBUPROFEN 800 MG: 800 TABLET ORAL at 20:40

## 2025-01-26 RX ADMIN — IBUPROFEN 800 MG: 800 TABLET ORAL at 13:48

## 2025-01-26 RX ADMIN — HUMAN RHO(D) IMMUNE GLOBULIN 300 MCG: 1500 SOLUTION INTRAMUSCULAR; INTRAVENOUS at 05:10

## 2025-01-26 RX ADMIN — ACETAMINOPHEN 650 MG: 325 TABLET, FILM COATED ORAL at 10:07

## 2025-01-26 RX ADMIN — IBUPROFEN 800 MG: 800 TABLET ORAL at 06:19

## 2025-01-26 RX ADMIN — ACETAMINOPHEN 650 MG: 325 TABLET, FILM COATED ORAL at 00:52

## 2025-01-26 RX ADMIN — DOCUSATE SODIUM 100 MG: 100 CAPSULE, LIQUID FILLED ORAL at 10:07

## 2025-01-26 ASSESSMENT — ACTIVITIES OF DAILY LIVING (ADL)
ADLS_ACUITY_SCORE: 17
ADLS_ACUITY_SCORE: 16
ADLS_ACUITY_SCORE: 17
ADLS_ACUITY_SCORE: 16
ADLS_ACUITY_SCORE: 17
ADLS_ACUITY_SCORE: 16
ADLS_ACUITY_SCORE: 17
ADLS_ACUITY_SCORE: 17
ADLS_ACUITY_SCORE: 16
ADLS_ACUITY_SCORE: 17
ADLS_ACUITY_SCORE: 16
ADLS_ACUITY_SCORE: 17
ADLS_ACUITY_SCORE: 17

## 2025-01-26 NOTE — PROCEDURES
Delivery Note  Diagnosis: Intrauterine pregnancy at 37w1d, Induction for GHTN  Procedure: Vaginal delivery, cervical rippening  Findings: Liveborn female infant, Apgars were  Pending  at 1 and 5 minutes respectively. Infant weight not yet recorded due to skin to skin.  Anesthesia: epidural   QBL:  25    Sherly Iglesias is a 24 year old  female at 37w1d weeks gestation. Her pregnancy was notable for GHTN . She presented for IOL secondary to GHTN.. NST was reactive upon admission. Patient progressed to complete and delivered a viable infant without complication. The infant was placed on the patient's abdomen. Cord clamping was delayed by 1 minutes, at which time the cord was doubly clamped and cut. The third stage was actively managed with external uterine massage, gentle cord traction and pitocin. The placenta delivered spontaneously and intact. First degree laceration noted that was repaired. Excellent hemostasis was noted. All counts were correct before and after the delivery.     Shama Mcneal MD

## 2025-01-26 NOTE — ANESTHESIA POSTPROCEDURE EVALUATION
Patient: Sherly Iglesias    Procedure: * No procedures listed *       Anesthesia Type:  Epidural    Note:     Postop Pain Control:    PONV:    Neuro/Psych:    Airway/Respiratory:    CV/Hemodynamics:    Other NRE:    DID A NON-ROUTINE EVENT OCCUR?     Event details/Postop Comments:      S/P epidural for labor.   I or my partner was immediately available for management of this patient during epidural analgesia infusion.  VSS.  Doing well. Block resolved.  Neuro at baseline. Denies positional headache. Minimal side effects easily managed w/ PRN meds. No apparent anesthetic complications. No follow-up required.    Lynn Ford MD             Last vitals:  Vitals:    01/26/25 0045 01/26/25 0500 01/26/25 0748   BP: 126/72 123/79 124/79   Pulse: 76 75 76   Resp: 16  17   Temp: 98.2  F (36.8  C)  97.9  F (36.6  C)   SpO2:          Electronically Signed By: Lynn Ford MD  January 26, 2025  7:57 AM

## 2025-01-26 NOTE — PLAN OF CARE
"VSS, pt breast feeding, talked about skin to skin with feedings and rationale, seen by lactation RN, supplementation started with ebm and donor milk; educated pt about 24 expectations, answered questions; brought ROP and encouraged to complete.  Problem: Adult Inpatient Plan of Care  Goal: Plan of Care Review  Description: The Plan of Care Review/Shift note should be completed every shift.  The Outcome Evaluation is a brief statement about your assessment that the patient is improving, declining, or no change.  This information will be displayed automatically on your shift  note.  Outcome: Progressing  Flowsheets (Taken 1/26/2025 1426)  Plan of Care Reviewed With:   patient   family  Goal: Patient-Specific Goal (Individualized)  Description: You can add care plan individualizations to a care plan. Examples of Individualization might be:  \"Parent requests to be called daily at 9am for status\", \"I have a hard time hearing out of my right ear\", or \"Do not touch me to wake me up as it startles  me\".  Outcome: Progressing  Goal: Absence of Hospital-Acquired Illness or Injury  Outcome: Progressing  Intervention: Prevent Skin Injury  Recent Flowsheet Documentation  Taken 1/26/2025 0749 by Marcelina Fiore RN  Body Position: position changed independently  Goal: Optimal Comfort and Wellbeing  Outcome: Progressing  Intervention: Monitor Pain and Promote Comfort  Recent Flowsheet Documentation  Taken 1/26/2025 1348 by Marcelina Fiore RN  Pain Management Interventions: medication (see MAR)  Taken 1/26/2025 1007 by Marcelina Fiore RN  Pain Management Interventions: medication (see MAR)  Taken 1/26/2025 0748 by Marcelina Fiore RN  Pain Management Interventions: declines  Intervention: Provide Person-Centered Care  Recent Flowsheet Documentation  Taken 1/26/2025 0749 by Marcelina Fiore RN  Trust Relationship/Rapport:   care explained   choices provided   emotional support provided   empathic " listening provided   questions answered   questions encouraged   reassurance provided   thoughts/feelings acknowledged  Goal: Readiness for Transition of Care  Outcome: Progressing     Problem: Postpartum (Vaginal Delivery)  Goal: Successful Parent Role Transition  Outcome: Progressing  Intervention: Support Parent Role Transition  Recent Flowsheet Documentation  Taken 1/26/2025 0749 by Marcelina Fiore RN  Supportive Measures:   active listening utilized   decision-making supported  Parent-Child Attachment Promotion:   caring behavior modeled   cue recognition promoted   rooming-in promoted  Goal: Hemostasis  Outcome: Progressing  Goal: Absence of Infection Signs and Symptoms  Outcome: Progressing  Goal: Anesthesia/Sedation Recovery  Outcome: Progressing  Goal: Optimal Pain Control and Function  Outcome: Progressing  Intervention: Prevent or Manage Pain  Recent Flowsheet Documentation  Taken 1/26/2025 1348 by Marcelina Fiore RN  Pain Management Interventions: medication (see MAR)  Taken 1/26/2025 1007 by Marcelina Fiore RN  Pain Management Interventions: medication (see MAR)  Taken 1/26/2025 0748 by Marcelina Fiore RN  Pain Management Interventions: declines  Goal: Effective Urinary Elimination  Outcome: Progressing   Goal Outcome Evaluation:      Plan of Care Reviewed With: patient, family

## 2025-01-26 NOTE — PLAN OF CARE
"Pt VSS. Postpartum checks WDL. Is bonding well with infant. Tolerating regular diet and able to ambulate independently. Urine output adequate, voids without difficulty. Pt utilizing tylenol for pain management. Breastfeeding infant every 2-3 hours.    Problem: Adult Inpatient Plan of Care  Goal: Plan of Care Review  Description: The Plan of Care Review/Shift note should be completed every shift.  The Outcome Evaluation is a brief statement about your assessment that the patient is improving, declining, or no change.  This information will be displayed automatically on your shift  note.  Outcome: Progressing  Flowsheets (Taken 1/26/2025 0538)  Plan of Care Reviewed With: patient  Overall Patient Progress: improving  Goal: Patient-Specific Goal (Individualized)  Description: You can add care plan individualizations to a care plan. Examples of Individualization might be:  \"Parent requests to be called daily at 9am for status\", \"I have a hard time hearing out of my right ear\", or \"Do not touch me to wake me up as it startles  me\".  Outcome: Progressing  Goal: Absence of Hospital-Acquired Illness or Injury  Outcome: Progressing  Intervention: Prevent Skin Injury  Recent Flowsheet Documentation  Taken 1/26/2025 0045 by Yudelka Frank RN  Body Position: position changed independently  Taken 1/25/2025 2015 by Yudelka Frank RN  Body Position: position changed independently  Intervention: Prevent Infection  Recent Flowsheet Documentation  Taken 1/26/2025 0045 by Yudelka Frank RN  Infection Prevention:   hand hygiene promoted   rest/sleep promoted  Taken 1/25/2025 2015 by Yudelka Frank RN  Infection Prevention:   hand hygiene promoted   rest/sleep promoted  Goal: Optimal Comfort and Wellbeing  Outcome: Progressing  Intervention: Provide Person-Centered Care  Recent Flowsheet Documentation  Taken 1/26/2025 0045 by Yudelka Frank RN  Trust Relationship/Rapport:   care explained   choices provided   questions " answered   questions encouraged   thoughts/feelings acknowledged  Taken 1/25/2025 2015 by Yudelka Frank RN  Trust Relationship/Rapport:   care explained   choices provided   questions answered   questions encouraged   thoughts/feelings acknowledged  Goal: Readiness for Transition of Care  Outcome: Progressing     Problem: Postpartum (Vaginal Delivery)  Goal: Successful Parent Role Transition  Outcome: Progressing  Goal: Hemostasis  Outcome: Progressing  Goal: Absence of Infection Signs and Symptoms  Outcome: Progressing  Goal: Anesthesia/Sedation Recovery  Outcome: Progressing  Goal: Optimal Pain Control and Function  Outcome: Progressing  Goal: Effective Urinary Elimination  Outcome: Progressing

## 2025-01-26 NOTE — PROGRESS NOTES
[facility]   Obstetrics Progress Note    Subjective: This is the patient's first day since Vaginal delivery. She is doing well.  She is urinating on her own. Pain is controlled with medication.    Objective:   All vitals stable  Temp: 97.9  F (36.6  C) Temp src: Oral BP: 124/79 Pulse: 76   Resp: 17 SpO2: 96 % O2 Device: None (Room air)      EXAM:  Constitutional: healthy, alert, no distress.   Abdomen: Abdomen soft, non-tender. BS normal. No masses, fundus is firm.  JOINT/EXTREMITIES: extremities normal     Last hemoglobin was   Hemoglobin   Date Value Ref Range Status   01/26/2025 10.7 (L) 11.7 - 15.7 g/dL Final   03/30/2019 12.9 11.7 - 15.7 g/dL Final   ]    Assessment: Stable postpartum course.  PPD#1    Plan: Routine care. Ambulation encouraged  Breast feeding strategies discussed  Pain control measures as needed  Reportable signs and symptoms dicussed with the patient  Discharge later today    Shama Mcneal MD

## 2025-01-26 NOTE — LACTATION NOTE
Lactation visit with patient. This is first baby for family. Baby 37 plus weeks. Discussed possibly feeding behaviors with this baby, and normal course of lactation. Importance of frequent feeds, stimulation, sts discussed.  Sherly states infant has been sleepy and has not breastfeed well since delivery. Has been doing STS. Baby brought to breast attempted both football and cross cradle with no success. Shield at bedside applied, as baby will suck on gloved finger. Baby briefly opens mouth with a non nutritive suck pattern. Patient agreeing to supplement with donor milk under shield. Baby took ebm and donor in a disorganized suck pattern. Baby tongue thrusting at times.     Baby more alert with next feed. Reinforced not going longer than three hours between feeds. SNS done. Baby continues to be disorganized. Pacifier given for suck training.      Plan to attempt to breastfeed every 2-3 hours, time for next feed written on board. Will call for help with SNS. Then will pump for stimulation. Questions answered. Reviewed cleaning and care of pump parts and nipple shield. Will follow tomorrow.

## 2025-01-26 NOTE — PLAN OF CARE
Pt transferred via wheelchair to Rm 425 @ 2100. Belongings sent with patient. [place] transferred via mothers arms. Writer will continue care postpartum.

## 2025-01-27 VITALS
BODY MASS INDEX: 23.18 KG/M2 | OXYGEN SATURATION: 96 % | HEIGHT: 65 IN | SYSTOLIC BLOOD PRESSURE: 119 MMHG | RESPIRATION RATE: 18 BRPM | WEIGHT: 139.11 LBS | TEMPERATURE: 98 F | HEART RATE: 83 BPM | DIASTOLIC BLOOD PRESSURE: 80 MMHG

## 2025-01-27 PROCEDURE — 250N000013 HC RX MED GY IP 250 OP 250 PS 637: Performed by: OBSTETRICS & GYNECOLOGY

## 2025-01-27 RX ADMIN — ACETAMINOPHEN 650 MG: 325 TABLET, FILM COATED ORAL at 07:53

## 2025-01-27 RX ADMIN — IBUPROFEN 800 MG: 800 TABLET ORAL at 05:25

## 2025-01-27 RX ADMIN — DOCUSATE SODIUM 100 MG: 100 CAPSULE, LIQUID FILLED ORAL at 07:53

## 2025-01-27 ASSESSMENT — ACTIVITIES OF DAILY LIVING (ADL)
ADLS_ACUITY_SCORE: 16

## 2025-01-27 NOTE — PLAN OF CARE
Data: Vital signs within normal limits. Postpartum checks within normal limits - see flow record. Patient eating and drinking normally. Patient able to empty bladder independently and is up ambulating. No apparent signs of infection. Patient performing self cares and is visiting infant in NICU often.  Action: Patient medicated during the shift for pain and cramping. See MAR. Patient reassessed within 1 hour after each medication and pain was improved - patient stated she was comfortable. Patient discharge education completed, no further questions   Response: Support person present.   Plan: Anticipate discharge to home this afternoon.      Problem: Postpartum (Vaginal Delivery)  Goal: Successful Parent Role Transition  Outcome: Met  Intervention: Support Parent Role Transition  Recent Flowsheet Documentation  Taken 1/27/2025 0745 by Jessica Castanon, RN  Supportive Measures:   active listening utilized   decision-making supported  Parent-Child Attachment Promotion: strengths emphasized  Goal: Hemostasis  Outcome: Met  Goal: Absence of Infection Signs and Symptoms  Outcome: Met  Goal: Anesthesia/Sedation Recovery  Outcome: Met  Goal: Optimal Pain Control and Function  Outcome: Met  Goal: Effective Urinary Elimination  Outcome: Met     Problem: Adult Inpatient Plan of Care  Goal: Plan of Care Review  Outcome: Met  Flowsheets (Taken 1/27/2025 0922)  Plan of Care Reviewed With:   patient   spouse  Overall Patient Progress: improving  Goal: Patient-Specific Goal (Individualized)  Outcome: Met  Goal: Absence of Hospital-Acquired Illness or Injury  Outcome: Met  Intervention: Prevent Skin Injury  Recent Flowsheet Documentation  Taken 1/27/2025 0745 by Jessica Castanon, RN  Body Position: position changed independently  Intervention: Prevent Infection  Recent Flowsheet Documentation  Taken 1/27/2025 0745 by Jessica Castanon, RN  Infection Prevention:   hand hygiene promoted   rest/sleep promoted  Goal: Optimal Comfort and  Wellbeing  Outcome: Met  Intervention: Provide Person-Centered Care  Recent Flowsheet Documentation  Taken 1/27/2025 0745 by Jessica Castanon, RN  Trust Relationship/Rapport:   care explained   choices provided   questions answered   questions encouraged   thoughts/feelings acknowledged  Goal: Readiness for Transition of Care  Outcome: Met       Goal Outcome Evaluation:      Plan of Care Reviewed With: patient, spouse    Overall Patient Progress: improvingOverall Patient Progress: improving

## 2025-01-27 NOTE — PROGRESS NOTES
COPIED FROM BABY'S CHART:    INITIAL SOCIAL WORK NICU ASSESSMENT      DATA:      Reason for Social Work Consult: BabyEzekiel admitted to the NICU at 37W 1D due to respiratory distress.     Presenting Information: SW met with Sherly SCOTT in her postpartum room.     Living Situation: Sherly shared she lives in a home in [place] with her significant other, Yonathan. Ezekiel is their first baby.      Social Support: Sherly shared Yonathan is supportive & involved.      Employment: Sherly works as a  at [employer]      Insurance: [employer] MA. SW discussed how to add baby onto her insurance & she voiced the financial counselors have also reached out to her.      Source of Financial Support: Parents employment      Mental Health History: Sherly voiced mild depression years ago but shared her mood has been stable since.      History of Postpartum Mood Disorders: Not applicable as this is Sherly's first baby.      Chemical Health History: none noted    Baby Supplies: MOB voiced they have all needed baby items & no barriers in being able to get additional baby items when needed.      INTERVENTION:      LAURE completed chart review and collaborated with the multidisciplinary team.   Psychosocial Assessment   Introduction to NICU  role and scope of practice   Discussed NICU experience and gave NICU welcome card  Reviewed Hospital and Community Resources   Assessed Chemical Health History and Current Symptoms   Assessed Mental Health History and Current Symptoms   Identified stressors, barriers and family concerns   Provided support and active empathetic listening and validation.   Provided psychoeducation on  mood and anxiety disorders, assessed for any current symptoms or history    ASSESSMENT:      Coping: adequate      Affect: calm, a little tearful     Mood: MOB shared she has been crying frequently today due to Ezekiel going to the NICU. She voiced she thinks it was harder to have  her in her room for a day & then having her go to the NICU. SW discussed baby blues & postpartum depression. SW reviewed resources available. Sherly shared she thinks her mood will stabilize once she is home and especially when Ezekiel comes home hopefully later this week.      Motivation/Ability to Access Services: motivated, independent     Assessment of Support System:  involved, stable, adequate      Level of engagement with SW: MOB was engaged & appropriate throughout the visit with SW. She denied ongoing needs.      Family and parent/infant interactions: SW was unable to observe due to no support person being present & baby being in the NICU.     Assessment of parental risk for PMAD: Higher than average risk due to baby unexpectantly going to the NICU.      Strengths: stable housing & transportation, reliable employment, willingness to accept help, involved support system      Vulnerabilities:  increased risk for PPD due to unexpected NICU admission      PLAN:      SW will continue to follow to assist with psychosocial concerns as needs arise.     NAYELI Romeo

## 2025-01-27 NOTE — PROVIDER NOTIFICATION
01/26/25 1945   Provider Notification   Provider Name/Title Dr. Mcneal   Method of Notification Electronic Page   Request Evaluate-Remote   Notification Reason Status Update     Updating MD that pt would like to stay tonight and not be discharged. OK to discontinue discharge order.

## 2025-01-27 NOTE — PLAN OF CARE
"Pt VSS. Postpartum checks WDL. Tolerating regular diet and able to ambulate independently. Urine output adequate, voids without difficulty.  Pt utilizing tylenol and ibuprofen for pain management. Pumping for  in NICU.    Problem: Adult Inpatient Plan of Care  Goal: Plan of Care Review  Description: The Plan of Care Review/Shift note should be completed every shift.  The Outcome Evaluation is a brief statement about your assessment that the patient is improving, declining, or no change.  This information will be displayed automatically on your shift  note.  Outcome: Progressing  Flowsheets (Taken 2025 0242)  Plan of Care Reviewed With: patient  Overall Patient Progress: improving  Goal: Patient-Specific Goal (Individualized)  Description: You can add care plan individualizations to a care plan. Examples of Individualization might be:  \"Parent requests to be called daily at 9am for status\", \"I have a hard time hearing out of my right ear\", or \"Do not touch me to wake me up as it startles  me\".  Outcome: Progressing  Goal: Absence of Hospital-Acquired Illness or Injury  Outcome: Progressing  Intervention: Prevent Skin Injury  Recent Flowsheet Documentation  Taken 2025 by Yudelka Frank RN  Body Position: position changed independently  Taken 2025 by Yudelka Frank RN  Body Position: position changed independently  Intervention: Prevent Infection  Recent Flowsheet Documentation  Taken 2025 by Yudelka Frank RN  Infection Prevention:   hand hygiene promoted   rest/sleep promoted  Taken 2025 by Yudelka Frank RN  Infection Prevention:   hand hygiene promoted   rest/sleep promoted  Goal: Optimal Comfort and Wellbeing  Outcome: Progressing  Intervention: Provide Person-Centered Care  Recent Flowsheet Documentation  Taken 2025 by Yudelka Frank RN  Trust Relationship/Rapport:   care explained   choices provided   questions answered   questions " encouraged   thoughts/feelings acknowledged  Taken 1/26/2025 2045 by Yudelka Frank RN  Trust Relationship/Rapport:   care explained   choices provided   questions answered   questions encouraged   thoughts/feelings acknowledged  Goal: Readiness for Transition of Care  Outcome: Progressing     Problem: Postpartum (Vaginal Delivery)  Goal: Successful Parent Role Transition  Outcome: Progressing  Goal: Hemostasis  Outcome: Progressing  Goal: Absence of Infection Signs and Symptoms  Outcome: Progressing  Goal: Anesthesia/Sedation Recovery  Outcome: Progressing  Goal: Optimal Pain Control and Function  Outcome: Progressing  Goal: Effective Urinary Elimination  Outcome: Progressing

## 2025-01-27 NOTE — PLAN OF CARE
Public Health Nurse (PHN) in to see patient to discuss [facility] ([facility]) programs. Patient is not interested in referral for a nurse visit at this time but will reach out to [facility] if interested in scheduling a nurse visit. PHN discussed [facility] community resource guide and rack cards and left these resources with patient.

## 2025-01-27 NOTE — LACTATION NOTE
Lactation follow up with whose baby was transferred to NICU yesterday evening. Encouraged pumping and hand expressing every 3 hours. Saved the hand expression video to phone to watch. When infant stable STS when able. Knows to call if questions or concerns.

## 2025-01-27 NOTE — DISCHARGE INSTRUCTIONS
Warning Signs after Having a Baby    Keep this paper on your fridge or somewhere else where you can see it.    Call your provider if you have any of these symptoms up to 12 weeks after having your baby.    Thoughts of hurting yourself or your baby  Pain in your chest or trouble breathing  Severe headache not helped by pain medicine  Eyesight concerns (blurry vision, seeing spots or flashes of light, other changes to eyesight)  Fainting, shaking or other signs of a seizure    Call 9-1-1 if you feel that it is an emergency.     The symptoms below can happen to anyone after giving birth. They can be very serious. Call your provider if you have any of these warning signs.    My provider s phone number: _______________________    Losing too much blood (hemorrhage)    Call your provider if you soak through a pad in less than an hour or pass blood clots bigger than a golf ball. These may be signs that you are bleeding too much.    Blood clots in the legs or lungs    After you give birth, your body naturally clots its blood to help prevent blood loss. Sometimes this increased clotting can happen in other areas of the body, like the legs or lungs. This can block your blood flow and be very dangerous.     Call your provider if you:  Have a red, swollen spot on the back of your leg that is warm or painful when you touch it.   Are coughing up blood.     Infection    Call your provider if you have any of these symptoms:  Fever of 100.4 F (38 C) or higher.  Pain or redness around your stitches if you had an incision.   Any yellow, white, or green fluid coming from places where you had stitches or surgery.    Mood Problems (postpartum depression)    Many people feel sad or have mood changes after having a baby. But for some people, these mood swings are worse.     Call your provider right away if you feel so anxious or nervous that you can't care for yourself or your baby.    Preeclampsia (high blood pressure)    Even if you  didn't have high blood pressure when you were pregnant, you are at risk for the high blood pressure disease called preeclampsia. This risk can last up to 12 weeks after giving birth.     Call your provider if you have:   Pain on your right side under your rib cage  Sudden swelling in the hands and face    Remember: You know your body. If something doesn't feel right, get medical help.     For informational purposes only. Not to replace the advice of your health care provider. Copyright 2020 [facility]. All rights reserved. Clinically reviewed by aJyne Delgado, RNC-OB, MSN. [employer] 041972 - Rev 02/23.

## 2025-01-27 NOTE — PROGRESS NOTES
"Feels well. Baby in NICU.  /80 (BP Location: Left arm, Patient Position: Sitting, Cuff Size: Adult Regular)   Pulse 83   Temp 98  F (36.7  C) (Oral)   Resp 18   Ht 1.651 m (5' 5\")   Wt 65.8 kg (145 lb)   SpO2 96%   Breastfeeding Unknown   BMI 24.13 kg/m    NAD  Abd Soft, NT    PPD2 s/p   Discharge home  BP check this week, monitor BPs at home, call if >140/90  Follow-up 2 and 6 weeks  Ashlie Jeffrey MD   "

## 2025-01-28 ENCOUNTER — LACTATION ENCOUNTER (OUTPATIENT)
Dept: OTHER | Facility: CLINIC | Age: 25
End: 2025-01-28

## 2025-01-28 NOTE — LACTATION NOTE
This note was copied from a baby's chart.  Lactation visit- Sherly has questions regarding flange sizes. States using 24 mm flange size at home but having milk leakage and hearing smacking noise during pumping. Assisted with measuring for flange size- right nipple measured at 16 mm- did not measure left d/t STS with infant on CPAP and mother did not want to move infant. Suggested 17-19 mm flange sizes for home. Gave 21 mm flanges to use in mean time.   Sherly reports using maintain mode but that she is not pumping for full 15 minutes anymore- states milk was over flowing with smaller bottles so would turn pump off. Bigger bottles provided and suggested pumping 15 minutes while establishing supply.   All questions answered.

## 2025-02-20 ENCOUNTER — APPOINTMENT (OUTPATIENT)
Dept: ULTRASOUND IMAGING | Facility: CLINIC | Age: 25
End: 2025-02-20
Attending: EMERGENCY MEDICINE
Payer: COMMERCIAL

## 2025-02-20 ENCOUNTER — HOSPITAL ENCOUNTER (EMERGENCY)
Facility: CLINIC | Age: 25
Discharge: HOME | End: 2025-02-20
Attending: EMERGENCY MEDICINE
Payer: COMMERCIAL

## 2025-02-20 VITALS
RESPIRATION RATE: 18 BRPM | HEIGHT: 65 IN | OXYGEN SATURATION: 97 % | WEIGHT: 129.19 LBS | TEMPERATURE: 97 F | SYSTOLIC BLOOD PRESSURE: 142 MMHG | BODY MASS INDEX: 21.52 KG/M2 | HEART RATE: 87 BPM | DIASTOLIC BLOOD PRESSURE: 95 MMHG

## 2025-02-20 DIAGNOSIS — N93.9 VAGINAL BLEEDING: ICD-10-CM

## 2025-02-20 LAB
ALBUMIN SERPL BCG-MCNC: 4.4 G/DL (ref 3.5–5.2)
ALP SERPL-CCNC: 101 U/L (ref 40–150)
ALT SERPL W P-5'-P-CCNC: 16 U/L (ref 0–50)
ANION GAP SERPL CALCULATED.3IONS-SCNC: 12 MMOL/L (ref 7–15)
AST SERPL W P-5'-P-CCNC: 23 U/L (ref 0–45)
BASOPHILS # BLD AUTO: 0.1 10E3/UL (ref 0–0.2)
BASOPHILS NFR BLD AUTO: 1 %
BILIRUB DIRECT SERPL-MCNC: <0.08 MG/DL (ref 0–0.3)
BILIRUB SERPL-MCNC: <0.2 MG/DL
BUN SERPL-MCNC: 14.3 MG/DL (ref 6–20)
CALCIUM SERPL-MCNC: 9 MG/DL (ref 8.8–10.4)
CHLORIDE SERPL-SCNC: 104 MMOL/L (ref 98–107)
CREAT SERPL-MCNC: 1.06 MG/DL (ref 0.51–0.95)
EGFRCR SERPLBLD CKD-EPI 2021: 75 ML/MIN/1.73M2
EOSINOPHIL # BLD AUTO: 0.2 10E3/UL (ref 0–0.7)
EOSINOPHIL NFR BLD AUTO: 4 %
ERYTHROCYTE [DISTWIDTH] IN BLOOD BY AUTOMATED COUNT: 13.7 % (ref 10–15)
GLUCOSE SERPL-MCNC: 95 MG/DL (ref 70–99)
HCG INTACT+B SERPL-ACNC: 2 MIU/ML
HCO3 SERPL-SCNC: 23 MMOL/L (ref 22–29)
HCT VFR BLD AUTO: 38.4 % (ref 35–47)
HGB BLD-MCNC: 12.3 G/DL (ref 11.7–15.7)
IMM GRANULOCYTES # BLD: 0 10E3/UL
IMM GRANULOCYTES NFR BLD: 0 %
LYMPHOCYTES # BLD AUTO: 2.3 10E3/UL (ref 0.8–5.3)
LYMPHOCYTES NFR BLD AUTO: 36 %
MCH RBC QN AUTO: 28.5 PG (ref 26.5–33)
MCHC RBC AUTO-ENTMCNC: 32 G/DL (ref 31.5–36.5)
MCV RBC AUTO: 89 FL (ref 78–100)
MONOCYTES # BLD AUTO: 0.6 10E3/UL (ref 0–1.3)
MONOCYTES NFR BLD AUTO: 10 %
NEUTROPHILS # BLD AUTO: 3.2 10E3/UL (ref 1.6–8.3)
NEUTROPHILS NFR BLD AUTO: 50 %
NRBC # BLD AUTO: 0 10E3/UL
NRBC BLD AUTO-RTO: 0 /100
PLATELET # BLD AUTO: 300 10E3/UL (ref 150–450)
POTASSIUM SERPL-SCNC: 4.1 MMOL/L (ref 3.4–5.3)
PROT SERPL-MCNC: 6.8 G/DL (ref 6.4–8.3)
RBC # BLD AUTO: 4.32 10E6/UL (ref 3.8–5.2)
SODIUM SERPL-SCNC: 139 MMOL/L (ref 135–145)
WBC # BLD AUTO: 6.5 10E3/UL (ref 4–11)

## 2025-02-20 PROCEDURE — 76856 US EXAM PELVIC COMPLETE: CPT

## 2025-02-20 PROCEDURE — 80048 BASIC METABOLIC PNL TOTAL CA: CPT | Performed by: EMERGENCY MEDICINE

## 2025-02-20 PROCEDURE — 250N000013 HC RX MED GY IP 250 OP 250 PS 637: Performed by: EMERGENCY MEDICINE

## 2025-02-20 PROCEDURE — 82040 ASSAY OF SERUM ALBUMIN: CPT | Performed by: EMERGENCY MEDICINE

## 2025-02-20 PROCEDURE — 36415 COLL VENOUS BLD VENIPUNCTURE: CPT | Performed by: EMERGENCY MEDICINE

## 2025-02-20 PROCEDURE — 85025 COMPLETE CBC W/AUTO DIFF WBC: CPT | Performed by: EMERGENCY MEDICINE

## 2025-02-20 PROCEDURE — 99284 EMERGENCY DEPT VISIT MOD MDM: CPT | Mod: 25

## 2025-02-20 PROCEDURE — 82248 BILIRUBIN DIRECT: CPT | Performed by: EMERGENCY MEDICINE

## 2025-02-20 PROCEDURE — 250N000011 HC RX IP 250 OP 636: Performed by: EMERGENCY MEDICINE

## 2025-02-20 PROCEDURE — 84702 CHORIONIC GONADOTROPIN TEST: CPT | Performed by: EMERGENCY MEDICINE

## 2025-02-20 RX ORDER — MISOPROSTOL 200 UG/1
800 TABLET ORAL ONCE
Status: COMPLETED | OUTPATIENT
Start: 2025-02-20 | End: 2025-02-20

## 2025-02-20 RX ORDER — ONDANSETRON 4 MG/1
4 TABLET, ORALLY DISINTEGRATING ORAL ONCE
Status: COMPLETED | OUTPATIENT
Start: 2025-02-20 | End: 2025-02-20

## 2025-02-20 RX ORDER — ONDANSETRON 4 MG/1
4 TABLET, ORALLY DISINTEGRATING ORAL EVERY 8 HOURS PRN
Qty: 10 TABLET | Refills: 0 | Status: SHIPPED | OUTPATIENT
Start: 2025-02-20 | End: 2025-02-23

## 2025-02-20 RX ADMIN — ONDANSETRON 4 MG: 4 TABLET, ORALLY DISINTEGRATING ORAL at 22:52

## 2025-02-20 RX ADMIN — MISOPROSTOL 800 MCG: 200 TABLET ORAL at 22:49

## 2025-02-20 ASSESSMENT — COLUMBIA-SUICIDE SEVERITY RATING SCALE - C-SSRS
1. IN THE PAST MONTH, HAVE YOU WISHED YOU WERE DEAD OR WISHED YOU COULD GO TO SLEEP AND NOT WAKE UP?: NO
6. HAVE YOU EVER DONE ANYTHING, STARTED TO DO ANYTHING, OR PREPARED TO DO ANYTHING TO END YOUR LIFE?: NO
2. HAVE YOU ACTUALLY HAD ANY THOUGHTS OF KILLING YOURSELF IN THE PAST MONTH?: NO

## 2025-02-20 ASSESSMENT — ACTIVITIES OF DAILY LIVING (ADL)
ADLS_ACUITY_SCORE: 42
ADLS_ACUITY_SCORE: 42

## 2025-02-21 NOTE — ED TRIAGE NOTES
Patient arrives from home - patient gave birth 1/25/25 and has been having bleeding since. Today she has had an increase in bleeding to soaking 2-3 pads an hour. Had gestational hypertension during pregnancy, never took any medications. Has experienced some dizziness today.

## 2025-02-21 NOTE — ED PROVIDER NOTES
"  Emergency Department Note      History of Present Illness     Chief Complaint  Vaginal Bleeding    HPI  Sherly Iglesias is a 24 year old female who presents to the emergency room with what appears to be postpartum bleeding.  She had a normal spontaneous vaginal delivery about 3 weeks ago, and notes that she has had a significant increase in her vaginal bleeding and lochia.  She states that for the last 3 days there has been much less mucus than normal, but does have significant increase in the amount of blood and she is active changing her pad about twice every hour or so this afternoon.  Has no pain, no new fall odor, no vomiting or diarrhea, no fevers.      Independent Historian  No    Review of External Notes  Yes I have reviewed the patient's last OB/GYN note from 1- where she was admitted for a normal spontaneous vaginal delivery.      Past Medical History   Medical History and Problem List  Past Medical History:   Diagnosis Date    Depressive disorder        Medications  acetaminophen (TYLENOL) 325 MG tablet  Prenatal Vit-Fe Fumarate-FA (PRENATAL MULTIVITAMIN  PLUS IRON) 27-1 MG TABS        Surgical History   Past Surgical History:   Procedure Laterality Date    GENITOURINARY SURGERY  2009    GYN SURGERY  2021    D&C         Physical Exam   Patient Vitals for the past 24 hrs:   BP Temp Temp src Pulse Resp SpO2 Height Weight   02/20/25 2041 (!) 142/95 97  F (36.1  C) Temporal 87 18 97 % 1.651 m (5' 5\") 58.6 kg (129 lb 3 oz)       Physical Exam  Vitals: reviewed by me  General: Pt seen on [facility], pleasant, cooperative, and alert to conversation  Eyes: Tracking well, clear conjunctiva BL  ENT: MMM, midline trachea.   Lungs: No tachypnea, no accessory muscle use. No respiratory distress.   CV: Rate as above  Abd: Soft, non tender, no guarding, no rebound. Non distended +  MSK: no joint effusion.  No evidence of trauma  Skin: No rash  Neuro: Clear speech and no facial droop.  Psych: Not RIS, no " "e/o /      Diagnostics   Lab Results   Labs Ordered and Resulted from Time of ED Arrival to Time of ED Departure   BASIC METABOLIC PANEL - Abnormal       Result Value    Sodium 139      Potassium 4.1      Chloride 104      Carbon Dioxide (CO2) 23      Anion Gap 12      Urea Nitrogen 14.3      Creatinine 1.06 (*)     GFR Estimate 75      Calcium 9.0      Glucose 95     HCG QUANTITATIVE PREGNANCY - Normal    hCG Quantitative 2     HEPATIC FUNCTION PANEL - Normal    Protein Total 6.8      Albumin 4.4      Bilirubin Total <0.2      Alkaline Phosphatase 101      AST 23      ALT 16      Bilirubin Direct <0.08     CBC WITH PLATELETS AND DIFFERENTIAL    WBC Count 6.5      RBC Count 4.32      Hemoglobin 12.3      Hematocrit 38.4      MCV 89      MCH 28.5      MCHC 32.0      RDW 13.7      Platelet Count 300      % Neutrophils 50      % Lymphocytes 36      % Monocytes 10      % Eosinophils 4      % Basophils 1      % Immature Granulocytes 0      NRBCs per 100 WBC 0      Absolute Neutrophils 3.2      Absolute Lymphocytes 2.3      Absolute Monocytes 0.6      Absolute Eosinophils 0.2      Absolute Basophils 0.1      Absolute Immature Granulocytes 0.0      Absolute NRBCs 0.0         Imaging  US Pelvic Complete with Transvaginal    (Results Pending)       EKG   ECG results from 03/30/19   EKG 12 lead     Value    Interpretation ECG Click View Image link to view waveform and result           Independent Interpretation  ***      ED Course      Medications Administered   Medications - No data to display       Procedures      Discussion of Management   Yes I discussed the case with the OB/GYN on-call for OB/GYN specialty care who recommended giving 800 mcg of Cytotec and having the patient follow in the clinic tomorrow morning with the previously scheduled appointment.        Optional/Additional Documentation  {Social Determinants of Health:163817::\"None\"}      Medical Decision Making / Diagnosis     CMS Diagnoses: Lactic acid " "***        MIPS   {ALESHIA MIPS:558591::\"None\"}        MDM  This is a very pleasant 24-year-old female who presents to the emergency room with what appears to be postpartum vaginal bleeding, unclear cause.  It does certainly seem like there is a dramatic increase in bleeding, but thankfully she is hemodynamically stable here and her hemoglobin is reassuring.  Based on my conversation with the OB/GYN, it was recommended to give a single oral dose of 800 mcg of Cytotec and Zofran as well and then have the patient follow-up with her previously scheduled appointment tomorrow morning.  I do think that this is reasonable since there is no real benefit to having the patient come into the hospital overnight since she actually looks to be doing quite well and is extremely reliable appearing.  I do not think that she needs to go emergently to the OR right now for D&C as she is quite stable and I do agree with OB/GYN's recommendation for close outpatient follow-up.  I do think that she will come back to the ER immediately if anything gets worse, will plan for discharge as above    ICD-10 Codes:  No diagnosis found.       Discharge Medications  New Prescriptions    No medications on file               "

## 2025-02-21 NOTE — DISCHARGE INSTRUCTIONS
As we discussed it does look like there is still a possibility of some vaginal bleeding and possibly from the placenta although it is not entirely clear based on the ultrasound.  I discussed the case with your OB/GYN, and they recommended the medication that you received here, and also to follow with your OB/GYN appointment tomorrow morning.  If you do not have an appointment, call your OB/GYN's office for [facility] in the morning and you will be offered a same-day appointment.  Come back to the ER immediately with any other concerns or if your bleeding increases in any way.